# Patient Record
Sex: MALE | Race: WHITE | URBAN - METROPOLITAN AREA
[De-identification: names, ages, dates, MRNs, and addresses within clinical notes are randomized per-mention and may not be internally consistent; named-entity substitution may affect disease eponyms.]

---

## 2024-01-22 ENCOUNTER — EMERGENCY (EMERGENCY)
Facility: HOSPITAL | Age: 29
LOS: 1 days | Discharge: HOME | End: 2024-01-22
Admitting: STUDENT IN AN ORGANIZED HEALTH CARE EDUCATION/TRAINING PROGRAM
Payer: COMMERCIAL

## 2024-01-22 VITALS
OXYGEN SATURATION: 96 % | HEART RATE: 91 BPM | WEIGHT: 149.91 LBS | DIASTOLIC BLOOD PRESSURE: 66 MMHG | TEMPERATURE: 98 F | RESPIRATION RATE: 16 BRPM | SYSTOLIC BLOOD PRESSURE: 105 MMHG | HEIGHT: 73 IN

## 2024-01-22 PROCEDURE — 99283 EMERGENCY DEPT VISIT LOW MDM: CPT

## 2024-01-22 PROCEDURE — 99282 EMERGENCY DEPT VISIT SF MDM: CPT

## 2024-01-22 NOTE — ED PROVIDER NOTE - PHYSICAL EXAMINATION
CONSTITUTIONAL: Well-appearing;  in no apparent distress.   HEAD: Normocephalic; atraumatic.   EYES: PERRL; EOM intact; conjunctiva and sclera clear: VA 20/20 when reading up close, 20/25 far, normal visual fields   ENT: normal nose; no rhinorrhea; normal pharynx with no erythema or lesions.   NECK: Supple; non-tender;   CARDIOVASCULAR: rrr,  RESPIRATORY: Breathing easily;   MSK: FROM at all extremities, normal tone   EXT: No cyanosis or edema; N/V intact  SKIN: Normal for age and race; warm; dry; good turgor; no apparent lesions or rash.  neuro: AAO x 3, CN II-XII intact, normal speech, strength 5/5 bilateral upper and lower extremities, sensation intact, cerebellum intact, no ataxia, follows commands appropriately

## 2024-01-22 NOTE — ED PROVIDER NOTE - NSFOLLOWUPCLINICS_GEN_ALL_ED_FT
[facility]  Ophthalmology  [address] [place] [address]  Phone: [phone]  Fax:

## 2024-01-22 NOTE — ED PROVIDER NOTE - NSFOLLOWUPINSTRUCTIONS_ED_ALL_ED_FT
Follow up with the eye doctor  Return to ED for worsening visual changes, headache, dizziness, double vision, flashes or floaters of light

## 2024-01-22 NOTE — ED ADULT TRIAGE NOTE - CHIEF COMPLAINT QUOTE
Pt reports b/l blurry vision x2-3 days stating "I cant read anything up close." Pt reports he wears contacts. No headache, no injury reported to eyes.

## 2024-01-22 NOTE — ED PROVIDER NOTE - PATIENT PORTAL LINK FT
You can access the FollowMyHealth Patient Portal offered by [facility] by registering at the following website: [email or web address]. By joining [facility]’s FollowMyHealth portal, you will also be able to view your health information using other applications (apps) compatible with our system.

## 2024-01-22 NOTE — ED PROVIDER NOTE - CLINICAL SUMMARY MEDICAL DECISION MAKING FREE TEXT BOX
29 yo M with no pmh c/o blurry vision when reading close on his phone or a computer screen x 2 days. States it appears hazy. Denies diplopia, ha, dizziness, n/v, flashes/floaters, slurred speech, trauma, eye pain, redness or tearing. Pt wears contacts. PERRL; EOM intact; conjunctiva and sclera clear: VA 20/20 when reading up close, 20/25 far, normal visual fields. neuro intact. will refer to [facility]

## 2024-01-22 NOTE — ED PROVIDER NOTE - OBJECTIVE STATEMENT
29 yo M with no pmh c/o blurry vision when reading close on his phone or a computer screen x 2 days. States it appears hazy. Denies diplopia, ha, dizziness, n/v, flashes/floaters, slurred speech, trauma, eye pain, redness or tearing. Pt wears contacts.

## 2024-01-25 DIAGNOSIS — H53.8 OTHER VISUAL DISTURBANCES: ICD-10-CM

## 2025-06-22 ENCOUNTER — EMERGENCY (EMERGENCY)
Facility: HOSPITAL | Age: 30
LOS: 1 days | End: 2025-06-22
Attending: EMERGENCY MEDICINE | Admitting: EMERGENCY MEDICINE
Payer: COMMERCIAL

## 2025-06-22 VITALS
HEIGHT: 73 IN | HEART RATE: 103 BPM | OXYGEN SATURATION: 95 % | WEIGHT: 160.06 LBS | SYSTOLIC BLOOD PRESSURE: 129 MMHG | TEMPERATURE: 100 F | RESPIRATION RATE: 18 BRPM | DIASTOLIC BLOOD PRESSURE: 66 MMHG

## 2025-06-22 VITALS — HEART RATE: 105 BPM

## 2025-06-22 LAB
ANION GAP SERPL CALC-SCNC: 12 MMOL/L — SIGNIFICANT CHANGE UP (ref 5–17)
BASOPHILS # BLD AUTO: 0.03 K/UL — SIGNIFICANT CHANGE UP (ref 0–0.2)
BASOPHILS NFR BLD AUTO: 0.3 % — SIGNIFICANT CHANGE UP (ref 0–2)
BUN SERPL-MCNC: 21 MG/DL — SIGNIFICANT CHANGE UP (ref 7–23)
CALCIUM SERPL-MCNC: 9.4 MG/DL — SIGNIFICANT CHANGE UP (ref 8.4–10.5)
CHLORIDE SERPL-SCNC: 100 MMOL/L — SIGNIFICANT CHANGE UP (ref 96–108)
CO2 SERPL-SCNC: 25 MMOL/L — SIGNIFICANT CHANGE UP (ref 22–31)
CREAT SERPL-MCNC: 1.11 MG/DL — SIGNIFICANT CHANGE UP (ref 0.5–1.3)
EGFR: 91 ML/MIN/1.73M2 — SIGNIFICANT CHANGE UP
EGFR: 91 ML/MIN/1.73M2 — SIGNIFICANT CHANGE UP
EOSINOPHIL # BLD AUTO: 0.05 K/UL — SIGNIFICANT CHANGE UP (ref 0–0.5)
EOSINOPHIL NFR BLD AUTO: 0.5 % — SIGNIFICANT CHANGE UP (ref 0–6)
GLUCOSE SERPL-MCNC: 91 MG/DL — SIGNIFICANT CHANGE UP (ref 70–99)
HCT VFR BLD CALC: 37.7 % — LOW (ref 39–50)
HGB BLD-MCNC: 12.6 G/DL — LOW (ref 13–17)
IMM GRANULOCYTES # BLD AUTO: 0.02 K/UL — SIGNIFICANT CHANGE UP (ref 0–0.07)
IMM GRANULOCYTES NFR BLD AUTO: 0.2 % — SIGNIFICANT CHANGE UP (ref 0–0.9)
LYMPHOCYTES # BLD AUTO: 1.36 K/UL — SIGNIFICANT CHANGE UP (ref 1–3.3)
LYMPHOCYTES NFR BLD AUTO: 14.5 % — SIGNIFICANT CHANGE UP (ref 13–44)
MCHC RBC-ENTMCNC: 29.4 PG — SIGNIFICANT CHANGE UP (ref 27–34)
MCHC RBC-ENTMCNC: 33.4 G/DL — SIGNIFICANT CHANGE UP (ref 32–36)
MCV RBC AUTO: 88.1 FL — SIGNIFICANT CHANGE UP (ref 80–100)
MONOCYTES # BLD AUTO: 0.86 K/UL — SIGNIFICANT CHANGE UP (ref 0–0.9)
MONOCYTES NFR BLD AUTO: 9.1 % — SIGNIFICANT CHANGE UP (ref 2–14)
NEUTROPHILS # BLD AUTO: 7.09 K/UL — SIGNIFICANT CHANGE UP (ref 1.8–7.4)
NEUTROPHILS NFR BLD AUTO: 75.4 % — SIGNIFICANT CHANGE UP (ref 43–77)
NRBC # BLD AUTO: 0 K/UL — SIGNIFICANT CHANGE UP (ref 0–0)
NRBC # FLD: 0 K/UL — SIGNIFICANT CHANGE UP (ref 0–0)
NRBC BLD AUTO-RTO: 0 /100 WBCS — SIGNIFICANT CHANGE UP (ref 0–0)
PLATELET # BLD AUTO: 168 K/UL — SIGNIFICANT CHANGE UP (ref 150–400)
PMV BLD: 9.7 FL — SIGNIFICANT CHANGE UP (ref 7–13)
POTASSIUM SERPL-MCNC: 4 MMOL/L — SIGNIFICANT CHANGE UP (ref 3.5–5.3)
POTASSIUM SERPL-SCNC: 4 MMOL/L — SIGNIFICANT CHANGE UP (ref 3.5–5.3)
RBC # BLD: 4.28 M/UL — SIGNIFICANT CHANGE UP (ref 4.2–5.8)
RBC # FLD: 12.1 % — SIGNIFICANT CHANGE UP (ref 10.3–14.5)
SODIUM SERPL-SCNC: 137 MMOL/L — SIGNIFICANT CHANGE UP (ref 135–145)
WBC # BLD: 9.41 K/UL — SIGNIFICANT CHANGE UP (ref 3.8–10.5)
WBC # FLD AUTO: 9.41 K/UL — SIGNIFICANT CHANGE UP (ref 3.8–10.5)

## 2025-06-22 PROCEDURE — 96375 TX/PRO/DX INJ NEW DRUG ADDON: CPT

## 2025-06-22 PROCEDURE — 87040 BLOOD CULTURE FOR BACTERIA: CPT

## 2025-06-22 PROCEDURE — 99284 EMERGENCY DEPT VISIT MOD MDM: CPT | Mod: 25

## 2025-06-22 PROCEDURE — 96365 THER/PROPH/DIAG IV INF INIT: CPT

## 2025-06-22 PROCEDURE — 36415 COLL VENOUS BLD VENIPUNCTURE: CPT

## 2025-06-22 PROCEDURE — 99285 EMERGENCY DEPT VISIT HI MDM: CPT

## 2025-06-22 PROCEDURE — 80048 BASIC METABOLIC PNL TOTAL CA: CPT

## 2025-06-22 PROCEDURE — 99408 AUDIT/DAST 15-30 MIN: CPT

## 2025-06-22 PROCEDURE — 85025 COMPLETE CBC W/AUTO DIFF WBC: CPT

## 2025-06-22 RX ORDER — VANCOMYCIN HCL IN 5 % DEXTROSE 1.5G/250ML
1000 PLASTIC BAG, INJECTION (ML) INTRAVENOUS ONCE
Refills: 0 | Status: COMPLETED | OUTPATIENT
Start: 2025-06-22 | End: 2025-06-22

## 2025-06-22 RX ORDER — SULFAMETHOXAZOLE/TRIMETHOPRIM 800-160 MG
1 TABLET ORAL
Qty: 20 | Refills: 0
Start: 2025-06-22 | End: 2025-07-01

## 2025-06-22 RX ORDER — CEPHALEXIN 250 MG/1
1 CAPSULE ORAL
Qty: 40 | Refills: 0
Start: 2025-06-22 | End: 2025-07-01

## 2025-06-22 RX ORDER — ACETAMINOPHEN 500 MG/5ML
650 LIQUID (ML) ORAL ONCE
Refills: 0 | Status: COMPLETED | OUTPATIENT
Start: 2025-06-22 | End: 2025-06-22

## 2025-06-22 RX ORDER — DIPHENHYDRAMINE HCL 12.5MG/5ML
25 ELIXIR ORAL ONCE
Refills: 0 | Status: COMPLETED | OUTPATIENT
Start: 2025-06-22 | End: 2025-06-22

## 2025-06-22 RX ADMIN — Medication 1000 MILLIGRAM(S): at 14:23

## 2025-06-22 RX ADMIN — Medication 650 MILLIGRAM(S): at 15:07

## 2025-06-22 RX ADMIN — Medication 250 MILLIGRAM(S): at 13:17

## 2025-06-22 RX ADMIN — Medication 25 MILLIGRAM(S): at 14:44

## 2025-06-22 RX ADMIN — Medication 650 MILLIGRAM(S): at 15:12

## 2025-06-22 NOTE — ED PROVIDER NOTE - OBJECTIVE STATEMENT
29yo M pmhx of type 1 bipolar disorder and substance abuse disorder c/o foot pain which he states he noticed last night. He states the foot pain occurs when he binge uses crystal meth of which he will either smoke, ingest, or inject. He reports heavily smoking crystal meth for the last 4 days with last use being 20 minutes ago. Prior to this, he was previously sober for 3 weeks before relapsing. Pt states he has had many relapses for the last 10 years and also reports hx of amphetamine use but within the last few years, has mostly used cystal meth. Of note, states he has had 2 similar episodes of this foot pain in the past with the most recent being 6 weeks ago for which he was seen at [facility] and given oral abx with improvement of  his symptoms. Denies, fever, chills, CP, SOB, or trauma. 31yo M pmhx of type 1 bipolar disorder and substance abuse disorder c/o foot pain which he states he noticed last night. He states the foot pain occurs when he binge uses crystal meth of which he will either smoke, ingest, or inject. He reports heavily smoking crystal meth for the last 4 days with last use being 20 minutes ago. Prior to this, he was previously sober for 3 weeks before relapsing. Pt states he has had many relapses for the last 10 years and also reports hx of amphetamine use but within the last few years, has mostly used crystal meth.  Pt has failed multiple failed rehab attempts as he states he continues to have cravings which cause him to relapse, states he has appointment for admission to rehab center in [place] on Tuesday. Of note, states he has had 2 similar episodes of this foot pain in the past with the most recent being 6 weeks ago for which he was seen at [facility] and given oral abx with improvement of  his symptoms. Denies, fever, chills, CP, SOB, or trauma. 31yo M smoker (1ppd every 2 days) with pmhx of type 1 bipolar disorder and substance abuse disorder c/o foot pain which he states he noticed last night. He states the foot pain occurs when he binge uses crystal meth of which he will either smoke, ingest, or inject. He reports heavily smoking crystal meth for the last 4 days with last use being 20 minutes ago. Prior to this, he was previously sober for 3 weeks before relapsing. Pt states he has had many relapses for the last 10 years and also reports hx of amphetamine use but within the last few years, has mostly used crystal meth.  Pt has failed multiple failed rehab attempts as he states he continues to have cravings which cause him to relapse, states he has appointment for admission to rehab center in [place] on Tuesday. Of note, states he has had 2 similar episodes of this foot pain in the past with the most recent being 6 weeks ago for which he was seen at [facility] and given oral abx with improvement of  his symptoms. Denies, fever, chills, CP, SOB, or trauma. 31yo M smoker (1ppd every 2 days) with pmhx of type 1 bipolar disorder and substance abuse disorder c/o foot pain which he states he noticed last night. He states he had gradual onset of redness, swelling and warmth, pain with walking since yesterday.   Patient stating he does not inject into the area, not sure why it started.  Of note, states he has had 2 similar episodes of this foot pain in the past with the most recent being 6 weeks ago for which he was seen at [facility] and given oral abx with improvement of  his symptoms. Denies, fever, chills, CP, SOB, numbness/tingling to ext, trauma, all other ROS negative.

## 2025-06-22 NOTE — ED ADULT NURSE REASSESSMENT NOTE - NS ED NURSE REASSESS COMMENT FT1
pt completed iv vancomycin infusion and approx 10 mins later complained of all over body itching. no rash hives redness noted no sob tongue to throat swelling/ tightness ANTONIO Cueva made aware

## 2025-06-22 NOTE — ED PROVIDER NOTE - PATIENT PORTAL LINK FT
You can access the FollowMyHealth Patient Portal offered by [facility] by registering at the following website: [email or web address]. By joining [facility]’s FollowMyHealth portal, you will also be able to view your health information using other applications (apps) compatible with our system.

## 2025-06-22 NOTE — ED PROVIDER NOTE - ATTENDING APP SHARED VISIT CONTRIBUTION OF CARE
31yo M smoker (1ppd every 2 days) with pmhx of type 1 bipolar disorder and substance abuse disorder c/o foot pain which he states he noticed last night. He states he had gradual onset of redness, swelling and warmth, pain with walking since yesterday.   Patient stating he does not inject into the area, not sure why it started.  Of note, states he has had 2 similar episodes of this foot pain in the past with the most recent being 6 weeks ago for which he was seen at [facility] and given oral abx with improvement of  his symptoms. Denies, fever, chills, CP, SOB, numbness/tingling to ext, trauma, all other ROS negative.      Pt afebrile in ED, ext NVI, exam consistent with cellulitis.  Labs with no leukocytosis, pt meets no SIRS criteria.  Pt given IV vanc in ED, will d/c with oral keflex/bactrim, given return precautions for worsening symptoms.    Addendum to attending statement: I have reviewed the ACP note and agree with the history, exam, and plan of care. I  was available to PA   as a supervising provider if needed. PA given opportunity to ask questions and request further evaluation / care.    Pt with erythema to right lower ext foot to above ankle, no area of abscess of fluctuance, no lymphangits, no fever or chills.  + cellulitis to right lower ext - labs, IV vanc in ED and will dc with keflex/bactrim.  Pt aware of return precautions for worsening redness, fever, chills.

## 2025-06-22 NOTE — ED PROVIDER NOTE - CLINICAL SUMMARY MEDICAL DECISION MAKING FREE TEXT BOX
29 y/o m hx polysubstance abuse, bipolar presents c/o right ankle redness, swelling, pain since last night, mild sx on left side.  Pt afebrile in ED, ext NVI, exam consistent with cellulitis.  Labs with no leukocytosis, pt meets no SIRS criteria.  Pt given IV vanc in ED, will d/c with oral keflex/bactrim, given return precautions for worsening symptoms.

## 2025-06-22 NOTE — ED ADULT NURSE NOTE - OBJECTIVE STATEMENT
pt received into spot 17 A&Ox4 ambulatory appears comfortable arrives via walk in triage for eval of right foot pain swelling and redness noticed yesterday. Pt states he has been on a meth binge and walking excessively over the last few days from [place] to [place] back and forth and feels his right foot is being effected. Hx of similar requiring abx 6 weeks ago and approx 1-2 years ago. Pt states "it feels like its a stress fracture but its swollen and red" no blunt trauma or injury, pt's footwear not supportive for walking, has been wearing slippers with socks. Pt denies any other drug use smokes meth does not inject, last smoked just prior to arrival in ED. Has not injected in years. No hx DM. Denies other medical complaints other than generalized body pains. No CP SOB abd pain n/v/d constipation. Respirations even and unlabored airway patent abd soft nondistended. pt placed in gown for eval

## 2025-06-22 NOTE — CHART NOTE - NSCHARTNOTEFT_GEN_A_CORE
Patient is a 30 year old male who came to the ED due to an infection in his ankle. Patient is a 30 year old male who came to the ED due to an infection in his ankle.  Patient admits to abusing Crystal Meth. .  Patient realizes this contributed to his leg infection. Patient has been on a binge with crystal meth. .  Patient is planning to attend a rehab for substance abuse treatment in [facility].  Patient reports he has Bipolar one .  Patient is treated by an Addiction Psychiatrist  DR. Tamez. Patient has been a polysubstance abuser for years.  Patient sounds very motivated to complete treatment.  SW completed an SBIRT.  SW spoke to provider about patient's treatment plans.

## 2025-06-22 NOTE — ED PROVIDER NOTE - MUSCULOSKELETAL, MLM
right ankle +erythema and warmth anteriorly, does not extend proximally, +edema to lower ankle and foot, skin intact, DP/PT 2+.  left ankle with mild erythema anteriorly with mild foot edema, no TTP, DP/PT 2+

## 2025-06-22 NOTE — ED ADULT TRIAGE NOTE - CHIEF COMPLAINT QUOTE
Pt co R foot infection since yesterday. Pt presents with redness to foot / ankle. Pt reports he is a crystal meth user and he relapsed two days ago and has been using crystal meth around the clock for 2 days. Pt reports he last smoked crystal meth "2 minutes ago."

## 2025-06-24 DIAGNOSIS — F17.290 NICOTINE DEPENDENCE, OTHER TOBACCO PRODUCT, UNCOMPLICATED: ICD-10-CM

## 2025-06-24 DIAGNOSIS — L03.115 CELLULITIS OF RIGHT LOWER LIMB: ICD-10-CM

## 2025-06-24 DIAGNOSIS — M79.89 OTHER SPECIFIED SOFT TISSUE DISORDERS: ICD-10-CM

## 2025-06-24 DIAGNOSIS — F31.9 BIPOLAR DISORDER, UNSPECIFIED: ICD-10-CM

## 2025-06-27 LAB
CULTURE RESULTS: SIGNIFICANT CHANGE UP
CULTURE RESULTS: SIGNIFICANT CHANGE UP
SPECIMEN SOURCE: SIGNIFICANT CHANGE UP
SPECIMEN SOURCE: SIGNIFICANT CHANGE UP